# Patient Record
Sex: MALE | Race: WHITE | NOT HISPANIC OR LATINO | Employment: OTHER | URBAN - METROPOLITAN AREA
[De-identification: names, ages, dates, MRNs, and addresses within clinical notes are randomized per-mention and may not be internally consistent; named-entity substitution may affect disease eponyms.]

---

## 2024-07-03 ENCOUNTER — EVALUATION (OUTPATIENT)
Dept: PHYSICAL THERAPY | Facility: CLINIC | Age: 64
End: 2024-07-03
Payer: COMMERCIAL

## 2024-07-03 DIAGNOSIS — M19.012 PRIMARY OSTEOARTHRITIS OF LEFT SHOULDER: ICD-10-CM

## 2024-07-03 DIAGNOSIS — M54.2 CERVICALGIA: Primary | ICD-10-CM

## 2024-07-03 PROCEDURE — 97535 SELF CARE MNGMENT TRAINING: CPT

## 2024-07-03 PROCEDURE — 97162 PT EVAL MOD COMPLEX 30 MIN: CPT

## 2024-07-03 RX ORDER — MONTELUKAST SODIUM 4 MG/1
4 TABLET, CHEWABLE ORAL
COMMUNITY

## 2024-07-03 RX ORDER — ATORVASTATIN CALCIUM 10 MG/1
10 TABLET, FILM COATED ORAL DAILY
COMMUNITY

## 2024-07-03 RX ORDER — ASPIRIN 81 MG/1
81 TABLET, CHEWABLE ORAL DAILY
COMMUNITY

## 2024-07-03 RX ORDER — LOSARTAN POTASSIUM AND HYDROCHLOROTHIAZIDE 12.5; 5 MG/1; MG/1
1 TABLET ORAL DAILY
COMMUNITY

## 2024-07-03 RX ORDER — OMEPRAZOLE 20 MG/1
20 CAPSULE, DELAYED RELEASE ORAL DAILY
COMMUNITY

## 2024-07-03 NOTE — PROGRESS NOTES
PT Evaluation     Today's date: 7/3/2024  Patient name: Duane A Lacock  : 1960  MRN: [id number]  Referring provider: Colleen Cota PA-C  Dx:   Encounter Diagnosis     ICD-10-CM    1. Cervicalgia  M54.2       2. Primary osteoarthritis of left shoulder  M19.012                      Assessment  Impairments: abnormal or restricted ROM, activity intolerance, impaired physical strength, lacks appropriate home exercise program and pain with function    Assessment details: Pt presents with pain/altered sensation left c-spine/LUE.  Hx chronic symptoms however recent significant increase in symptoms noted.  Symptom increased following progression of weight with exercise program.  Reports symptoms with static sitting and following activity. Reports mm spasms/tightness left upper back and scapular region.  Will benefit from PT tx to decrease symptoms and improve functional level.       Prognosis: good    Goals  ST.  Decreased pain 50% 6 wk   2.  Decrease headaches 50% 6 wk  3.  Increase c-spine ROM to Min limitations 6wk  4.  Increased c-spine strength to 5/5 6 wk   5.  Pt will report no difficulty with light ADL's 6 wk    LT.  Pt will report no pain 12 wk  2.  Pt will report no headaches 12 wk  3.  Increase c-spine AROM to WNL 12 wk  4.  Increase c-spine strength to WNL 12 wk  5.  Pt will report no limitations with ADL's 12 wk  6.  Pt will return to normal exercise program with no difficulties 12 wk    Plan  Patient would benefit from: PT eval and skilled physical therapy  Planned modality interventions: cryotherapy, thermotherapy: hydrocollator packs, unattended electrical stimulation, ultrasound and traction    Planned therapy interventions: strengthening, stretching, therapeutic activities, therapeutic exercise, flexibility, functional ROM exercises, home exercise program, neuromuscular re-education, joint mobilization, abdominal trunk stabilization, manual therapy, postural training and self  care    Frequency: 3x week  Duration in weeks: 12  Treatment plan discussed with: patient        Subjective Evaluation    History of Present Illness  Mechanism of injury: Hx cervical and left shoulder pain,  (Onset 11 years ago)  following mitral valve surgery.  Saw neurosurgeon and found herniated disc/osteophytes no surgery.  Had PT which resolved symptoms.  Has had tx in the past which helped.   Reports 3 years ago was having spasms left upper trap/shoulder.  Had PT again and symptoms resolved.  Recently increased weight with free weights and begain having symptoms again.  Reports 24 had numbness left face and UE.  Went to ER and imaging revealed continued issues with c-spine.  TIA/CVA ruled out.  Saw Neurosurgeon and PT and pain managemnet recommended.  To see Neurologist on  or possible EMG.    Patient Goals  Patient goals for therapy: decreased pain  Patient goal: Retrun to normal routine and daily activity  Pain  At worst pain ratin  Location: Left upper trap/LUE to hand  Relieving factors: ice and rest  Progression: improved    Hand dominance: right  Exercise history: Exercises 5 days a week.          Objective     Concurrent Complaints  Positive for headaches. Negative for night pain and disturbed sleep    Additional Special Questions  Daily headaches noted     Postural Observations    Additional Postural Observation Details  Minimal forward rounded shoulders  Left shoulder elevated     Palpation   Left   Muscle spasm in the upper trapezius.   Tenderness of the cervical paraspinals, levator scapulae, middle trapezius, rhomboids and upper trapezius.     Additional Palpation Details  Tenderness and MM Spasms in left scapular mm    Active Range of Motion   Cervical/Thoracic Spine       Cervical  Subcranial protraction:  WFL   Subcranial retraction:  WFL   Flexion:  WFL  Extension:  WFL  Left lateral flexion:  Restriction level: moderate  Right lateral flexion:  Restriction level moderate  Left  rotation:  WFL  Right rotation:  WFL    Joint Play   Joints within functional limits: T1, T2, T3, T4, T5 and T6     Strength/Myotome Testing   Cervical Spine   Neck extension: 4+  Neck flexion: 4+    Left   Neck lateral flexion (C3): 4+    Right   Neck lateral flexion (C3): 4+    Left Shoulder     Planes of Motion   Flexion: 5   Adduction: 5   External rotation at 0°: 5   Internal rotation at 0°: 5     Isolated Muscles   Upper trapezius: 5     Right Shoulder     Planes of Motion   Flexion: 5   Adduction: 5   External rotation at 0°: 5   Internal rotation at 0°: 5     Isolated Muscles   Upper trapezius: 5     Left Elbow   Flexion: 5  Extension: 5    Right Elbow   Flexion: 5  Extension: 5    Additional Strength Details  Minimal symptoms with resisted shoulder ER on left in left scapular region    Tests   Cervical   Negative vertical compression and cervical distraction.     Left   Positive Spurling's Test A.     Right   Negative Spurling's Test A.     Lumbar   Negative vertical compression.   Neuro Exam:     Headaches   Patient reports headaches: Yes.            Precautions:  N/A       Manuals 7-3-24       Manual traction        Stretch c-spine        STM                 Neuro Re-Ed         Chin tuck        Chin tuck w/ cervical ext        Wall micah        Supine neck flexion        Shrug/post roll                        Ther Ex        UBE  alt        Prone shld flex, ext, horz abd        Self upper trap stretch        Doorway ER stretch        Self levator stretch                        HEP Instructed and issued HEP  ([id number])       Ther Activity                        Gait Training                        Modalities        CP/MHP

## 2024-07-03 NOTE — LETTER
July 3, 2024    Colleen Cota PA-C  [address] [place] [address]    Patient: Duane A Lacock   YOB: 1960   Date of Visit: 7/3/2024     Encounter Diagnosis     ICD-10-CM    1. Cervicalgia  M54.2       2. Primary osteoarthritis of left shoulder  M19.012           Dear Dr. Cota:    Thank you for your recent referral of Duane A Lacock. Please review the attached evaluation summary from Duane's recent visit.     Please verify that you agree with the plan of care by signing the attached order.     If you have any questions or concerns, please do not hesitate to call.     I sincerely appreciate the opportunity to share in the care of one of your patients and hope to have another opportunity to work with you in the near future.       Sincerely,    Isak Stapleton, PT      Referring Provider:      I certify that I have read the below Plan of Care and certify the need for these services furnished under this plan of treatment while under my care.                    Colleen Cota PA-C   [address] [place] [address]  Via Fax: [phone]          PT Evaluation     Today's date: 7/3/2024  Patient name: Duane A Lacock  : 1960  MRN: [id number]  Referring provider: Colleen Cota PA-C  Dx:   Encounter Diagnosis     ICD-10-CM    1. Cervicalgia  M54.2       2. Primary osteoarthritis of left shoulder  M19.012                      Assessment  Impairments: abnormal or restricted ROM, activity intolerance, impaired physical strength, lacks appropriate home exercise program and pain with function    Assessment details: Pt presents with pain/altered sensation left c-spine/LUE.  Hx chronic symptoms however recent significant increase in symptoms noted.  Symptom increased following progression of weight with exercise program.  Reports symptoms with static sitting and following activity. Reports mm spasms/tightness left upper back and scapular region.  Will benefit from PT tx to decrease symptoms  and improve functional level.       Prognosis: good    Goals  ST.  Decreased pain 50% 6 wk   2.  Decrease headaches 50% 6 wk  3.  Increase c-spine ROM to Min limitations 6wk  4.  Increased c-spine strength to 5/5 6 wk   5.  Pt will report no difficulty with light ADL's 6 wk    LT.  Pt will report no pain 12 wk  2.  Pt will report no headaches 12 wk  3.  Increase c-spine AROM to WNL 12 wk  4.  Increase c-spine strength to WNL 12 wk  5.  Pt will report no limitations with ADL's 12 wk  6.  Pt will return to normal exercise program with no difficulties 12 wk    Plan  Patient would benefit from: PT eval and skilled physical therapy  Planned modality interventions: cryotherapy, thermotherapy: hydrocollator packs, unattended electrical stimulation, ultrasound and traction    Planned therapy interventions: strengthening, stretching, therapeutic activities, therapeutic exercise, flexibility, functional ROM exercises, home exercise program, neuromuscular re-education, joint mobilization, abdominal trunk stabilization, manual therapy, postural training and self care    Frequency: 3x week  Duration in weeks: 12  Treatment plan discussed with: patient        Subjective Evaluation    History of Present Illness  Mechanism of injury: Hx cervical and left shoulder pain,  (Onset 11 years ago)  following mitral valve surgery.  Saw neurosurgeon and found herniated disc/osteophytes no surgery.  Had PT which resolved symptoms.  Has had tx in the past which helped.   Reports 3 years ago was having spasms left upper trap/shoulder.  Had PT again and symptoms resolved.  Recently increased weight with free weights and begain having symptoms again.  Reports 24 had numbness left face and UE.  Went to ER and imaging revealed continued issues with c-spine.  TIA/CVA ruled out.  Saw Neurosurgeon and PT and pain managemnet recommended.  To see Neurologist on  or possible EMG.    Patient Goals  Patient goals for therapy: decreased  pain  Patient goal: Retrun to normal routine and daily activity  Pain  At worst pain ratin  Location: Left upper trap/LUE to hand  Relieving factors: ice and rest  Progression: improved    Hand dominance: right  Exercise history: Exercises 5 days a week.          Objective     Concurrent Complaints  Positive for headaches. Negative for night pain and disturbed sleep    Additional Special Questions  Daily headaches noted     Postural Observations    Additional Postural Observation Details  Minimal forward rounded shoulders  Left shoulder elevated     Palpation   Left   Muscle spasm in the upper trapezius.   Tenderness of the cervical paraspinals, levator scapulae, middle trapezius, rhomboids and upper trapezius.     Additional Palpation Details  Tenderness and MM Spasms in left scapular mm    Active Range of Motion   Cervical/Thoracic Spine       Cervical  Subcranial protraction:  WFL   Subcranial retraction:  WFL   Flexion:  WFL  Extension:  WFL  Left lateral flexion:  Restriction level: moderate  Right lateral flexion:  Restriction level moderate  Left rotation:  WFL  Right rotation:  WFL    Joint Play   Joints within functional limits: T1, T2, T3, T4, T5 and T6     Strength/Myotome Testing   Cervical Spine   Neck extension: 4+  Neck flexion: 4+    Left   Neck lateral flexion (C3): 4+    Right   Neck lateral flexion (C3): 4+    Left Shoulder     Planes of Motion   Flexion: 5   Adduction: 5   External rotation at 0°: 5   Internal rotation at 0°: 5     Isolated Muscles   Upper trapezius: 5     Right Shoulder     Planes of Motion   Flexion: 5   Adduction: 5   External rotation at 0°: 5   Internal rotation at 0°: 5     Isolated Muscles   Upper trapezius: 5     Left Elbow   Flexion: 5  Extension: 5    Right Elbow   Flexion: 5  Extension: 5    Additional Strength Details  Minimal symptoms with resisted shoulder ER on left in left scapular region    Tests   Cervical   Negative vertical compression and cervical  distraction.     Left   Positive Spurling's Test A.     Right   Negative Spurling's Test A.     Lumbar   Negative vertical compression.   Neuro Exam:     Headaches   Patient reports headaches: Yes.            Precautions:  N/A       Manuals 7-3-24       Manual traction        Stretch c-spine        STM                 Neuro Re-Ed         Chin tuck        Chin tuck w/ cervical ext        Wall micah        Supine neck flexion        Shrug/post roll                        Ther Ex        UBE  alt        Prone shld flex, ext, horz abd        Self upper trap stretch        Doorway ER stretch        Self levator stretch                        HEP Instructed and issued HEP  ([id number])       Ther Activity                        Gait Training                        Modalities        CP/MHP

## 2024-07-08 ENCOUNTER — OFFICE VISIT (OUTPATIENT)
Dept: PHYSICAL THERAPY | Facility: CLINIC | Age: 64
End: 2024-07-08
Payer: COMMERCIAL

## 2024-07-08 DIAGNOSIS — M19.012 PRIMARY OSTEOARTHRITIS OF LEFT SHOULDER: ICD-10-CM

## 2024-07-08 DIAGNOSIS — M54.2 CERVICALGIA: Primary | ICD-10-CM

## 2024-07-08 PROCEDURE — 97110 THERAPEUTIC EXERCISES: CPT

## 2024-07-08 PROCEDURE — 97112 NEUROMUSCULAR REEDUCATION: CPT

## 2024-07-08 PROCEDURE — 97140 MANUAL THERAPY 1/> REGIONS: CPT

## 2024-07-08 NOTE — PROGRESS NOTES
"Daily Note     Today's date: 2024  Patient name: Duane A Lacock  : 1960  MRN: [id number]  Referring provider: Colleen Cota PA-C  Dx:   Encounter Diagnosis     ICD-10-CM    1. Cervicalgia  M54.2       2. Primary osteoarthritis of left shoulder  M19.012                      Subjective:   The home stretches went ok.  A little sore with them just because I'm not used to doing them      Objective: See treatment diary below      Assessment: Tolerated treatment well.  Continued tenderness noted left scapular region with STM.  No complaints voiced with TE.    Patient would benefit from continued PT      Plan: Continue per plan of care.       Precautions:  N/A       Manuals 7-3-24 24      Manual traction  3'      Stretch c-spine        STM   12'  c-spine/left shoulder-scap              Neuro Re-Ed         Chin tuck  Red t-band resistance 2x10      Chin tuck w/ cervical ext        Wall micah  2x10      Supine neck flexion  2x10      Shrug/post roll                        Ther Ex        UBE  alt  8'  alt      Prone shld flex, ext, horz abd  2x10 ea      Self upper trap stretch  5x 15-20\"  cesar      Doorway ER stretch        Self levator stretch                        HEP Instructed and issued HEP  ([id number])       Ther Activity                        Gait Training                        Modalities        CP/MHP                    "

## 2024-07-11 ENCOUNTER — OFFICE VISIT (OUTPATIENT)
Dept: PHYSICAL THERAPY | Facility: CLINIC | Age: 64
End: 2024-07-11
Payer: COMMERCIAL

## 2024-07-11 DIAGNOSIS — M19.012 PRIMARY OSTEOARTHRITIS OF LEFT SHOULDER: ICD-10-CM

## 2024-07-11 DIAGNOSIS — M54.2 CERVICALGIA: Primary | ICD-10-CM

## 2024-07-11 PROCEDURE — 97140 MANUAL THERAPY 1/> REGIONS: CPT

## 2024-07-11 PROCEDURE — 97110 THERAPEUTIC EXERCISES: CPT

## 2024-07-11 PROCEDURE — 97112 NEUROMUSCULAR REEDUCATION: CPT

## 2024-07-11 NOTE — PROGRESS NOTES
"Daily Note     Today's date: 2024  Patient name: Duane A Lacock  : 1960  MRN: [id number]  Referring provider: Colleen Cota PA-C  Dx:   Encounter Diagnosis     ICD-10-CM    1. Cervicalgia  M54.2       2. Primary osteoarthritis of left shoulder  M19.012                      Subjective: Duane reports soreness to cesar UT. He reports radicular symptoms cesar but more intensity to L. He reports at times radicular symptoms going down to hands but today is to mid forearm.       Objective: See treatment diary below      Assessment: Added LTP and \"Ts\" this visit to continue to progressing scapular strengthening and ensure no UT compensation. Increased tone to L UT and increased elevation in L UE compared to R in upright position noted during tband exercises; tactile cues to correct. Pt denied any radicular symptoms by end of visit. Pt would continue to benefit from skilled PT.       Plan: Continue with current POC to address pt deficits.      Precautions:  N/A       Manuals 7-3-24 7-8-24 7-11-24     Manual traction  3' 3'      Stretch c-spine        STM   12'  c-spine/left shoulder-scap 12' c-spine/L shoulder and scap             Neuro Re-Ed         Chin tuck  Red t-band resistance 2x10 Red t-band resistance 2x10     Chin tuck w/ cervical ext        Wall micah  2x10 2x10     Supine neck flexion  2x10 2x10     Shrug/post roll        LTP   Dbl blue 2x10     T's at webslide    Dbl blue 2x10     Ther Ex        UBE  alt  8'  alt 8' alt      Prone shld flex, ext, horz abd  2x10 ea 2x10 ea     Self upper trap stretch  5x 15-20\"  cesar 5x 15-20\"  cesar     Doorway ER stretch        Self levator stretch                        HEP Instructed and issued HEP  ([id number])       Ther Activity                        Gait Training                        Modalities        CP/MHP                     "

## 2024-07-15 ENCOUNTER — OFFICE VISIT (OUTPATIENT)
Dept: PHYSICAL THERAPY | Facility: CLINIC | Age: 64
End: 2024-07-15
Payer: COMMERCIAL

## 2024-07-15 DIAGNOSIS — M19.012 PRIMARY OSTEOARTHRITIS OF LEFT SHOULDER: ICD-10-CM

## 2024-07-15 DIAGNOSIS — M54.2 CERVICALGIA: Primary | ICD-10-CM

## 2024-07-15 PROCEDURE — 97140 MANUAL THERAPY 1/> REGIONS: CPT

## 2024-07-15 PROCEDURE — 97110 THERAPEUTIC EXERCISES: CPT

## 2024-07-15 PROCEDURE — 97112 NEUROMUSCULAR REEDUCATION: CPT

## 2024-07-15 NOTE — PROGRESS NOTES
"Daily Note     Today's date: 7/15/2024  Patient name: Duane A Lacock  : 1960  MRN: [id number]  Referring provider: Colleen Cota PA-C  Dx:   Encounter Diagnosis     ICD-10-CM    1. Cervicalgia  M54.2       2. Primary osteoarthritis of left shoulder  M19.012                      Subjective:   I'm doing better.  I feel its improving       Objective: See treatment diary below      Assessment: Tolerated treatment well.  Able to increase reps with TE.  Reports improving symptoms.  Continued mm tightness noted left upper trap/scapular region.   Patient would benefit from continued PT      Plan: Continue per plan of care.      Precautions:  N/A       Manuals 7-3-24 7-8-24 7-11-24 7-15-24    Manual traction  3' 3'  3'    Stretch c-spine        STM   12'  c-spine/left shoulder-scap 12' c-spine/L shoulder and scap 12'  c-spine, left shoulder/scap            Neuro Re-Ed         Chin tuck  Red t-band resistance 2x10 Red t-band resistance 2x10 Red 3x10    Chin tuck w/ cervical ext        Wall micah  2x10 2x10 3x10    Supine neck flexion  2x10 2x10 3x10    Shrug/post roll        LTP   Dbl blue 2x10 Blue 3x10 LTP/MTP    T's at webslide    Dbl blue 2x10 Blue 3x10    Ther Ex        UBE  alt  8'  alt 8' alt  10'  alt    Prone shld flex, ext, horz abd  2x10 ea 2x10 ea 3x10 ea     Self upper trap stretch  5x 15-20\"  cesar 5x 15-20\"  cesar 5x  15\"  cesar    Doorway ER stretch        Self levator stretch                        HEP Instructed and issued HEP  ([id number])       Ther Activity                        Gait Training                        Modalities        CP/MHP                       "

## 2024-07-18 ENCOUNTER — OFFICE VISIT (OUTPATIENT)
Dept: PHYSICAL THERAPY | Facility: CLINIC | Age: 64
End: 2024-07-18
Payer: COMMERCIAL

## 2024-07-18 DIAGNOSIS — M19.012 PRIMARY OSTEOARTHRITIS OF LEFT SHOULDER: ICD-10-CM

## 2024-07-18 DIAGNOSIS — M54.2 CERVICALGIA: Primary | ICD-10-CM

## 2024-07-18 PROCEDURE — 97110 THERAPEUTIC EXERCISES: CPT

## 2024-07-18 PROCEDURE — 97140 MANUAL THERAPY 1/> REGIONS: CPT

## 2024-07-18 PROCEDURE — 97112 NEUROMUSCULAR REEDUCATION: CPT

## 2024-07-18 NOTE — PROGRESS NOTES
"Daily Note     Today's date: 2024  Patient name: Duane A Lacock  : 1960  MRN: [id number]  Referring provider: Colleen Cota PA-C  Dx:   Encounter Diagnosis     ICD-10-CM    1. Cervicalgia  M54.2       2. Primary osteoarthritis of left shoulder  M19.012                      Subjective:   I'm feeling better.  I still feel it when I'm sitting       Objective: See treatment diary below      Assessment: Tolerated treatment well.   Held supine BUE flexion as he feels this was irritating c-spine.  Added T-band \"Y\" which he did well with.  No increase in symptoms noted with tx.  Feels he is improving.  Patient would benefit from continued PT      Plan: Continue per plan of care.      Precautions:  N/A       Manuals 7-3-24 7-8-24 7-11-24 7-15-24 7-18-24   Manual traction  3' 3'  3' 5'   Stretch c-spine        STM   12'  c-spine/left shoulder-scap 12' c-spine/L shoulder and scap 12'  c-spine, left shoulder/scap 10'  c-spine, left shoulder/scap           Neuro Re-Ed         Chin tuck  Red t-band resistance 2x10 Red t-band resistance 2x10 Red 3x10 Red 3x10     Chin tuck w/ cervical ext        Wall micah  2x10 2x10 3x10 3x10   Supine neck flexion  2x10 2x10 3x10 3x10    Shrug/post roll        LTP/MTP   Dbl blue 2x10 Blue 3x10 LTP/MTP Blue 3x10    T's at webslide    Dbl blue 2x10 Blue 3x10 T and Y   Blue 3x10   Ther Ex        UBE  alt  8'  alt 8' alt  10'  alt 10'  alt   Prone shld flex, ext, horz abd  2x10 ea 2x10 ea 3x10 ea  Abd/Ext 3x10 ea   Self upper trap stretch  5x 15-20\"  cesar 5x 15-20\"  cesar 5x  15\"  cesar 5x  15\"  cesar   Doorway ER stretch        Self levator stretch                        HEP Instructed and issued HEP  ([id number])       Ther Activity                        Gait Training                        Modalities        CP/MHP                         "

## 2024-07-23 ENCOUNTER — OFFICE VISIT (OUTPATIENT)
Dept: PHYSICAL THERAPY | Facility: CLINIC | Age: 64
End: 2024-07-23
Payer: COMMERCIAL

## 2024-07-23 DIAGNOSIS — M19.012 PRIMARY OSTEOARTHRITIS OF LEFT SHOULDER: ICD-10-CM

## 2024-07-23 DIAGNOSIS — M54.2 CERVICALGIA: Primary | ICD-10-CM

## 2024-07-23 PROCEDURE — 97140 MANUAL THERAPY 1/> REGIONS: CPT

## 2024-07-23 PROCEDURE — 97112 NEUROMUSCULAR REEDUCATION: CPT

## 2024-07-23 PROCEDURE — 97110 THERAPEUTIC EXERCISES: CPT

## 2024-07-23 NOTE — PROGRESS NOTES
"Daily Note     Today's date: 2024  Patient name: Duane A Lacock  : 1960  MRN: [id number]  Referring provider: Colleen Cota PA-C  Dx:   Encounter Diagnosis     ICD-10-CM    1. Cervicalgia  M54.2       2. Primary osteoarthritis of left shoulder  M19.012                      Subjective:   Reports soreness from pruning trees over the weekend.      Objective: See treatment diary below      Assessment: Tolerated treatment well.  Has follow up with Neurology this week for c-spine.  Reports overall improved with PT however symptoms do persist intermittently.  Patient would benefit from continued PT      Plan: Continue per plan of care.      Precautions:  N/A       Manuals 7-23-24 7-8-24 7-11-24 7-15-24 7-18-24   Manual traction 3' 3' 3'  3' 5'   Stretch c-spine        STM  10' 12'  c-spine/left shoulder-scap 12' c-spine/L shoulder and scap 12'  c-spine, left shoulder/scap 10'  c-spine, left shoulder/scap           Neuro Re-Ed         Chin tuck Red t-band 3x10 Red t-band resistance 2x10 Red t-band resistance 2x10 Red 3x10 Red 3x10     Chin tuck w/ cervical ext        Wall micah 3x10 2x10 2x10 3x10 3x10   Supine neck flexion 3x10   2x10 2x10 3x10 3x10    Shrug/post roll        LTP/MTP Blue 3x10   Dbl blue 2x10 Blue 3x10 LTP/MTP Blue 3x10    T's at webslide  Blue 3x10  ea T and Y  Dbl blue 2x10 Blue 3x10 T and Y   Blue 3x10   Ther Ex        UBE  alt 10'  alt 8'  alt 8' alt  10'  alt 10'  alt   Prone shld flex, ext, horz abd 3x10 ea  abd/ext 2x10 ea 2x10 ea 3x10 ea  Abd/Ext 3x10 ea   Self upper trap stretch 5x  15-20\" 5x 15-20\"  cesar 5x 15-20\"  cesar 5x  15\"  cesar 5x  15\"  cesar   Doorway ER stretch 5x  15-20\"        Self levator stretch                        HEP        Ther Activity                        Gait Training                        Modalities        CP/MHP                           "

## 2024-07-25 ENCOUNTER — OFFICE VISIT (OUTPATIENT)
Dept: PHYSICAL THERAPY | Facility: CLINIC | Age: 64
End: 2024-07-25
Payer: COMMERCIAL

## 2024-07-25 DIAGNOSIS — M19.012 PRIMARY OSTEOARTHRITIS OF LEFT SHOULDER: ICD-10-CM

## 2024-07-25 DIAGNOSIS — M54.2 CERVICALGIA: Primary | ICD-10-CM

## 2024-07-25 PROCEDURE — 97140 MANUAL THERAPY 1/> REGIONS: CPT

## 2024-07-25 PROCEDURE — 97112 NEUROMUSCULAR REEDUCATION: CPT

## 2024-07-25 PROCEDURE — 97110 THERAPEUTIC EXERCISES: CPT

## 2024-07-25 NOTE — PROGRESS NOTES
"Daily Note     Today's date: 2024  Patient name: Duane A Lacock  : 1960  MRN: [id number]  Referring provider: Colleen Cota PA-C  Dx:   Encounter Diagnosis     ICD-10-CM    1. Cervicalgia  M54.2       2. Primary osteoarthritis of left shoulder  M19.012                      Subjective: Duane reports having recent f/u with neurologist and is to continue with PT as he feels it is helping before seeing a neurosurgeon but he neurologist feels his radicular symptoms are coming from his c-spine.       Objective: See treatment diary below      Assessment: Improvement in soft tissue quality following STM to cesar UT,cerv paraspinals, scap retractors. Increased tone to L UT vs R. Denied any radicular symptoms with exercises performed. Added cesar ER with scap retract. Pt would continue to benefit from skilled PT .       Plan: Continue with current POC to address pt deficits.      Precautions:  N/A       Manuals 7-23-24 7-25-24 7-11-24 7-15-24 7-18-24   Manual traction 3' 3'  3'  3' 5'   Stretch c-spine        STM  10' 10' 12' c-spine/L shoulder and scap 12'  c-spine, left shoulder/scap 10'  c-spine, left shoulder/scap           Neuro Re-Ed         Chin tuck Red t-band 3x10 Grn tband 2x10 3-5\" Red t-band resistance 2x10 Red 3x10 Red 3x10     Chin tuck w/ cervical ext        Wall micah 3x10 3x10 2x10 3x10 3x10   Supine neck flexion 3x10   3x10 2x10 3x10 3x10    Shrug/post roll        LTP/MTP Blue 3x10  Blue 3x10 ea (consider increasing NV Dbl blue 2x10 Blue 3x10 LTP/MTP Blue 3x10    Cesar ER with scap retraction   Blue 3x10      T's at webslide  Blue 3x10  ea T and Y Blue 3x10 ea T and Y  Dbl blue 2x10 Blue 3x10 T and Y   Blue 3x10   Ther Ex        UBE  alt 10'  alt 10' alt  8' alt  10'  alt 10'  alt   Prone shld flex, ext, horz abd 3x10 ea  abd/ext 3x10 ea abd/ext  2x10 ea 3x10 ea  Abd/Ext 3x10 ea   Self upper trap stretch 5x  15-20\" 5x 15\" cesar +levator  5x 15-20\"  cesar 5x  15\"  cesar 5x  15\"  cesar   Doorway ER stretch 5x  " "15-20\"        Self levator stretch                        HEP        Ther Activity                        Gait Training                        Modalities        CP/MHP                             "

## 2024-07-30 ENCOUNTER — OFFICE VISIT (OUTPATIENT)
Dept: PHYSICAL THERAPY | Facility: CLINIC | Age: 64
End: 2024-07-30
Payer: COMMERCIAL

## 2024-07-30 DIAGNOSIS — M54.2 CERVICALGIA: Primary | ICD-10-CM

## 2024-07-30 DIAGNOSIS — M19.012 PRIMARY OSTEOARTHRITIS OF LEFT SHOULDER: ICD-10-CM

## 2024-07-30 PROCEDURE — 97140 MANUAL THERAPY 1/> REGIONS: CPT

## 2024-07-30 PROCEDURE — 97112 NEUROMUSCULAR REEDUCATION: CPT

## 2024-07-30 PROCEDURE — 97110 THERAPEUTIC EXERCISES: CPT

## 2024-07-30 NOTE — PROGRESS NOTES
"Daily Note     Today's date: 2024  Patient name: Duane A Lacock  : 1960  MRN: [id number]  Referring provider: Colleen Cota PA-C  Dx:   Encounter Diagnosis     ICD-10-CM    1. Cervicalgia  M54.2       2. Primary osteoarthritis of left shoulder  M19.012                      Subjective:   I saw the Neurologist and she said there are degenerative changes in the neck.  They are having me see a Neurosurgeon and I might see a pain management doctor also.        Objective: See treatment diary below      Assessment: Tolerated treatment well.   Held use of T-band with retractions as pt feels having Ue's in elevated position aggravates c-spine.  Reports retractions did feel good without use of t-band.  Has begun use of home traction unit again and no increase in symptoms noted with this.  Continued varied symptoms noted.  Patient would benefit from continued PT      Plan: Continue per plan of care.      Precautions:  N/A       Manuals 7-23-24 7-25-24 7-30-24 7-15-24 7-18-24   Manual traction 3' 3'  3'  3' 5'   Stretch c-spine        STM  10' 10' 12' c-spine/L shoulder and scap 12'  c-spine, left shoulder/scap 10'  c-spine, left shoulder/scap           Neuro Re-Ed         Chin tuck Red t-band 3x10 Grn tband 2x10 3-5\" 3x10  3-5\" Red 3x10 Red 3x10     Chin tuck w/ cervical ext        Wall micah 3x10 3x10 3x10 3x10 3x10   Supine neck flexion 3x10   3x10 3x10 3x10 3x10    Shrug/post roll        LTP/MTP Blue 3x10  Blue 3x10 ea (consider increasing NV Dbl blue 3x10 Blue 3x10 LTP/MTP Blue 3x10    Cesar ER with scap retraction   Blue 3x10 Blue 3x10     T's at webslide  Blue 3x10  ea T and Y Blue 3x10 ea T and Y  Dbl blue 3x10  \"T\" Blue 3x10 T and Y   Blue 3x10   Ther Ex        UBE  alt 10'  alt 10' alt  10' alt  L1.5  10'  alt 10'  alt   Prone shld flex, ext, horz abd 3x10 ea  abd/ext 3x10 ea abd/ext   3x10 ea  Abd/Ext 3x10 ea   Self upper trap stretch 5x  15-20\" 5x 15\" cesar +levator  5x 15-20\"  cesar 5x  15\"  cesar 5x  15\"  " "cesar   Doorway ER stretch 5x  15-20\"   5x  15-20\"       Self levator stretch   5x  15-20\"  cesar                     HEP        Ther Activity                        Gait Training                        Modalities        CP/MHP                               "

## 2024-08-02 ENCOUNTER — OFFICE VISIT (OUTPATIENT)
Dept: PHYSICAL THERAPY | Facility: CLINIC | Age: 64
End: 2024-08-02
Payer: COMMERCIAL

## 2024-08-02 DIAGNOSIS — M19.012 PRIMARY OSTEOARTHRITIS OF LEFT SHOULDER: ICD-10-CM

## 2024-08-02 DIAGNOSIS — M54.2 CERVICALGIA: Primary | ICD-10-CM

## 2024-08-02 PROCEDURE — 97112 NEUROMUSCULAR REEDUCATION: CPT

## 2024-08-02 PROCEDURE — 97110 THERAPEUTIC EXERCISES: CPT

## 2024-08-02 PROCEDURE — 97140 MANUAL THERAPY 1/> REGIONS: CPT

## 2024-08-02 NOTE — PROGRESS NOTES
"Daily Note     Today's date: 2024  Patient name: Duane A Lacock  : 1960  MRN: [id number]  Referring provider: Colleen Cota PA-C  Dx:   Encounter Diagnosis     ICD-10-CM    1. Cervicalgia  M54.2       2. Primary osteoarthritis of left shoulder  M19.012                      Subjective:   I'm doing ok today      Objective: See treatment diary below      Assessment: Tolerated treatment well.   Reports feeling better with modified tx.  Has used traction at home without increased symptoms.  Patient would benefit from continued PT      Plan: Continue per plan of care.      Precautions:  N/A       Manuals 24   Manual traction 3' 3'  3'  3' 5'   Stretch c-spine        STM  10' 10' 12' c-spine/L shoulder and scap 12'  c-spine, left shoulder/scap 10'  c-spine, left shoulder/scap           Neuro Re-Ed         Chin tuck Red t-band 3x10 Grn tband 2x10 3-5\" 3x10  3-5\" 3x10  3-5\" Red 3x10     Chin tuck w/ cervical ext        Wall micah 3x10 3x10 3x10 3x10 3x10   Supine neck flexion 3x10   3x10 3x10 3x10 3x10    Shrug/post roll        LTP/MTP Blue 3x10  Blue 3x10 ea (consider increasing NV Dbl blue 3x10 Blue 3x15 LTP/MTP Blue 3x10    Cesar ER with scap retraction   Blue 3x10 Blue 3x10     T's at [facility]  Blue 3x10  ea T and Y Blue 3x10 ea T and Y  Dbl blue 3x10  \"T\" Blue 3x15 T and Y   Blue 3x10   Ther Ex        UBE  alt 10'  alt 10' alt  10' alt  L1.5  10'  alt   L 1.5 10'  alt   Prone shld flex, ext, horz abd 3x10 ea  abd/ext 3x10 ea abd/ext    Abd/Ext 3x10 ea   Self upper trap stretch 5x  15-20\" 5x 15\" cesar +levator  5x 15-20\"  cesar 5x  15\"  cesar 5x  15\"  cesar   Doorway ER stretch 5x  15-20\"   5x  15-20\"   5x  15\"  cesar    Self levator stretch   5x  15-20\"  cesar 5x  15\"  cesar                    HEP        Ther Activity                        Gait Training                        Modalities        CP/MHP                                 "

## 2024-08-06 ENCOUNTER — OFFICE VISIT (OUTPATIENT)
Dept: PHYSICAL THERAPY | Facility: CLINIC | Age: 64
End: 2024-08-06
Payer: COMMERCIAL

## 2024-08-06 DIAGNOSIS — M19.012 PRIMARY OSTEOARTHRITIS OF LEFT SHOULDER: ICD-10-CM

## 2024-08-06 DIAGNOSIS — M54.2 CERVICALGIA: Primary | ICD-10-CM

## 2024-08-06 PROCEDURE — 97110 THERAPEUTIC EXERCISES: CPT

## 2024-08-06 PROCEDURE — 97140 MANUAL THERAPY 1/> REGIONS: CPT

## 2024-08-06 PROCEDURE — 97112 NEUROMUSCULAR REEDUCATION: CPT

## 2024-08-06 NOTE — PROGRESS NOTES
"Daily Note     Today's date: 2024  Patient name: Duane A Lacock  : 1960  MRN: [id number]  Referring provider: Colleen Cota PA-C  Dx:   Encounter Diagnosis     ICD-10-CM    1. Cervicalgia  M54.2       2. Primary osteoarthritis of left shoulder  M19.012                      Subjective:   I've been feeling alright       Objective: See treatment diary below      Assessment: Tolerated treatment well.  No pain noted with tx.  Reports overall improved however intermittent symptoms persist.  Patient would benefit from continued PT      Plan: Continue per plan of care.      Precautions:  N/A       Manuals 24   Manual traction 3' 3'  3'  3' 3'   Stretch c-spine        STM  10' 10' 12' c-spine/L shoulder and scap 12'  c-spine, left shoulder/scap 10'  c-spine, left shoulder/scap           Neuro Re-Ed         Chin tuck Red t-band 3x10 Grn tband 2x10 3-5\" 3x10  3-5\" 3x10  3-5\"  3x10     Chin tuck w/ cervical ext        Wall micah 3x10 3x10 3x10 3x10 3x10   Supine neck flexion 3x10   3x10 3x10 3x10 3x10    Shrug/post roll        LTP/MTP Blue 3x10  Blue 3x10 ea (consider increasing NV Dbl blue 3x10 Blue 3x15 LTP/MTP Black 3x10    Cesar ER with scap retraction   Blue 3x10 Blue 3x10     T's at webslide  Blue 3x10  ea T and Y Blue 3x10 ea T and Y  Dbl blue 3x10  \"T\" Blue 3x15 \"T\" black 3x10   Ther Ex        UBE  alt 10'  alt 10' alt  10' alt  L1.5  10'  alt   L 1.5 10'  alt  L2   Prone shld flex, ext, horz abd 3x10 ea  abd/ext 3x10 ea abd/ext       Self upper trap stretch 5x  15-20\" 5x 15\" cesar +levator  5x 15-20\"  cesar 5x  15\"  cesar 5x  15\"  cesar   Doorway ER stretch 5x  15-20\"   5x  15-20\"   5x  15\"  cesar 10x  15\"   Self levator stretch   5x  15-20\"  cesar 5x  15\"  cesar 5x  15\"  cesar                   HEP        Ther Activity                        Gait Training                        Modalities        CP/MHP                                   "

## 2024-08-08 ENCOUNTER — OFFICE VISIT (OUTPATIENT)
Dept: PHYSICAL THERAPY | Facility: CLINIC | Age: 64
End: 2024-08-08
Payer: COMMERCIAL

## 2024-08-08 DIAGNOSIS — M19.012 PRIMARY OSTEOARTHRITIS OF LEFT SHOULDER: ICD-10-CM

## 2024-08-08 DIAGNOSIS — M54.2 CERVICALGIA: Primary | ICD-10-CM

## 2024-08-08 PROCEDURE — 97110 THERAPEUTIC EXERCISES: CPT

## 2024-08-08 PROCEDURE — 97112 NEUROMUSCULAR REEDUCATION: CPT

## 2024-08-08 PROCEDURE — 97140 MANUAL THERAPY 1/> REGIONS: CPT

## 2024-08-08 NOTE — PROGRESS NOTES
"Daily Note     Today's date: 2024  Patient name: Duane A Lacock  : 1960  MRN: [id number]  Referring provider: Colleen Cota PA-C  Dx:   Encounter Diagnosis     ICD-10-CM    1. Cervicalgia  M54.2       2. Primary osteoarthritis of left shoulder  M19.012           Start Time: 0943          Subjective: Duane reports less intensity of radicular symptoms since starting skilled PT and also feels less pain/discomfort in L shoulder.       Objective: See treatment diary below      Assessment: Visual and VC to ensure correct exercise technique. Improvement in soft tissue quality/tone to cesar UT, cerv paraspinals following manuals, increased tightness in R UT vs L. Denied any increases in pain with exercises performed. Progressed LTP/MTP to machine resistance with good tolerance. Progress as able.       Plan: Continue with current POC to address pt deficits.      Precautions:  N/A       Manuals 24   Manual traction 3'  3'  3'  3' 3'   Stretch c-spine        STM  10' c-spine, L shoulder/scap  10' 12' c-spine/L shoulder and scap 12'  c-spine, left shoulder/scap 10'  c-spine, left shoulder/scap           Neuro Re-Ed         Chin tuck 3x10 Grn tband 2x10 3-5\" 3x10  3-5\" 3x10  3-5\"  3x10     Chin tuck w/ cervical ext        Wall micah 3x10 3x10 3x10 3x10 3x10   Supine neck flexion 3x10 3x10 3x10 3x10 3x10    Shrug/post roll        LTP/MTP Progressed to machine weights HEP Blue 3x10 ea (consider increasing NV Dbl blue 3x10 Blue 3x15 LTP/MTP Black 3x10    Lat pulldown  40# 3x10       Seated row  40# 3x10       Cesar ER with scap retraction  Blue 3x10 Blue 3x10 Blue 3x10     T's at webslide  \"T\" black 3x10 Blue 3x10 ea T and Y  Dbl blue 3x10  \"T\" Blue 3x15 \"T\" black 3x10   Ther Ex        UBE  alt 10' alt L2  10' alt  10' alt  L1.5  10'  alt   L 1.5 10'  alt  L2   Prone shld flex, ext, horz abd  3x10 ea abd/ext       Self upper trap stretch 5x 15\" cesar  5x 15\" cesar +levator  5x 15-20\"  cesar 5x  " "15\"  cesar 5x  15\"  cesar   Doorway ER stretch 10x 15\"  5x  15-20\"   5x  15\"  cesar 10x  15\"   Self levator stretch 5x 15\" cesar   5x  15-20\"  csear 5x  15\"  cesar 5x  15\"  cesar                   HEP        Ther Activity                        Gait Training                        Modalities        CP/MHP                                     "

## 2024-08-14 ENCOUNTER — OFFICE VISIT (OUTPATIENT)
Dept: PHYSICAL THERAPY | Facility: CLINIC | Age: 64
End: 2024-08-14
Payer: COMMERCIAL

## 2024-08-14 DIAGNOSIS — M54.2 CERVICALGIA: Primary | ICD-10-CM

## 2024-08-14 DIAGNOSIS — M19.012 PRIMARY OSTEOARTHRITIS OF LEFT SHOULDER: ICD-10-CM

## 2024-08-14 PROCEDURE — 97140 MANUAL THERAPY 1/> REGIONS: CPT

## 2024-08-14 PROCEDURE — 97112 NEUROMUSCULAR REEDUCATION: CPT

## 2024-08-14 PROCEDURE — 97110 THERAPEUTIC EXERCISES: CPT

## 2024-08-14 NOTE — PROGRESS NOTES
"Daily Note     Today's date: 2024  Patient name: Duane A Lacock  : 1960  MRN: [id number]  Referring provider: Colleen Cota PA-C  Dx:   Encounter Diagnosis     ICD-10-CM    1. Cervicalgia  M54.2       2. Primary osteoarthritis of left shoulder  M19.012                      Subjective: Duane reports doing well in reference to c-spine and L shoulder. He would like to avoid any chest press as he is to avoid bench press due to L shoulder pain. He reports great decrease in intensity of radicular symptoms and has moments w/o radicular symptoms. He doesn't think about the pain as much and can sleep without difficulty.       Objective: See treatment diary below      Assessment: Visual and VC to ensure correct exercise technique. Improvement in soft tissue quality to cesar UT, cerv paraspinals, and suboccipitals following manuals. Pt educated depending on the severity of disc herniation/ pathology they can reabsorb but may benefit from further education regarding aging and disc changes/healing and myelomalacia. Pt denied any increases in pain with exercises performed. Pt would continue to benefit from skilled PT.       Plan: Continue with current POC to address pt deficits.      Precautions:  N/A       Manuals 2424   Manual traction 3'  3'  3'  3' 3'   Stretch c-spine        STM  10' c-spine, L shoulder/scap  10' c-spine, L shoulder/scap 12' c-spine/L shoulder and scap 12'  c-spine, left shoulder/scap 10'  c-spine, left shoulder/scap           Neuro Re-Ed         Chin tuck 3x10 3x10 3x10  3-5\" 3x10  3-5\"  3x10     Chin tuck w/ cervical ext        Wall micah 3x10 3x10 3x10 3x10 3x10   Supine neck flexion 3x10 3x10 3x10 3x10 3x10    Shrug/post roll        LTP/MTP Progressed to machine weights HEP  Dbl blue 3x10 Blue 3x15 LTP/MTP Black 3x10    Lat pulldown  40# 3x10 45# 3x10      Seated row  40# 3x10 40# 3x10      Cesar ER with scap retraction  Blue 3x10 Blue 3x10 Blue 3x10     T's at " "webslide  \"T\" black 3x10 \"T\" black 3x10 Dbl blue 3x10  \"T\" Blue 3x15 \"T\" black 3x10   Ther Ex        UBE  alt 10' alt L2  10' alt L2  10' alt  L1.5  10'  alt   L 1.5 10'  alt  L2   Prone shld flex, ext, horz abd        Self upper trap stretch 5x 15\" cesar  5x 15\" cesar  5x 15-20\"  cesar 5x  15\"  cesar 5x  15\"  cesar   Doorway ER stretch 10x 15\" 10x15\" 5x  15-20\"   5x  15\"  cesar 10x  15\"   Self levator stretch 5x 15\" cesar  5x15\" cesar  5x  15-20\"  cesar 5x  15\"  cesar 5x  15\"  cesar                   HEP        Ther Activity                        Gait Training                        Modalities        CP/MHP                                       "

## 2024-08-16 ENCOUNTER — OFFICE VISIT (OUTPATIENT)
Dept: PHYSICAL THERAPY | Facility: CLINIC | Age: 64
End: 2024-08-16
Payer: COMMERCIAL

## 2024-08-16 DIAGNOSIS — M19.012 PRIMARY OSTEOARTHRITIS OF LEFT SHOULDER: ICD-10-CM

## 2024-08-16 DIAGNOSIS — M54.2 CERVICALGIA: Primary | ICD-10-CM

## 2024-08-16 PROCEDURE — 97140 MANUAL THERAPY 1/> REGIONS: CPT

## 2024-08-16 PROCEDURE — 97110 THERAPEUTIC EXERCISES: CPT

## 2024-08-16 PROCEDURE — 97112 NEUROMUSCULAR REEDUCATION: CPT

## 2024-08-16 NOTE — PROGRESS NOTES
"Daily Note     Today's date: 2024  Patient name: Duane A Lacock  : 1960  MRN: [id number]  Referring provider: Colleen Cota PA-C  Dx:   Encounter Diagnosis     ICD-10-CM    1. Cervicalgia  M54.2       2. Primary osteoarthritis of left shoulder  M19.012                      Subjective:   I'm doing better.  No much pain       Objective: See treatment diary below      Assessment: Tolerated treatment well.   Reports minimal symptoms now with activity.  Doing well with ADL performance.  Reports prolonged sitting hard surfaces will still cause symptoms.  Patient would benefit from continued PT      Plan: Continue per plan of care.      Precautions:  N/A       Manuals 24   Manual traction 3'  3'  3'  3' 3'   Stretch c-spine        STM  10' c-spine, L shoulder/scap  10' c-spine, L shoulder/scap 8' c-spine/L shoulder and scap 12'  c-spine, left shoulder/scap 10'  c-spine, left shoulder/scap           Neuro Re-Ed         Chin tuck 3x10 3x10 3x10  3-5\" 3x10  3-5\"  3x10     Chin tuck w/ cervical ext        Wall micah 3x10 3x10 3x10 3x10 3x10   Supine neck flexion 3x10 3x10 3x10 3x10 3x10    Shrug/post roll        LTP/MTP Progressed to machine weights HEP  Blue 3x10 Blue 3x15 LTP/MTP Black 3x10    Lat pulldown  40# 3x10 45# 3x10 45#  3x10     Seated row  40# 3x10 40# 3x10 40# 2x15  (2 )     Cesar ER with scap retraction  Blue 3x10 Blue 3x10 Blue 3x10     T's at webslide  \"T\" black 3x10 \"T\" black 3x10 Dbl blue 3x10  \"T\" Blue 3x15 \"T\" black 3x10   Ther Ex        UBE  alt 10' alt L2  10' alt L2  10' alt  L2 10'  alt   L 1.5 10'  alt  L2   Prone shld flex, ext, horz abd        Self upper trap stretch 5x 15\" cesar  5x 15\" cesar  5x 15-20\"  cesar 5x  15\"  cesar 5x  15\"  cesar   Doorway ER stretch 10x 15\" 10x15\" 10x  15-20\"   5x  15\"  cesar 10x  15\"   Self levator stretch 5x 15\" cesar  5x15\" cesar  5x  15-20\"  cesar 5x  15\"  cesar 5x  15\"  cesar                   HEP        Ther Activity                      "   Gait Training                        Modalities        CP/MHP

## 2024-08-20 ENCOUNTER — APPOINTMENT (OUTPATIENT)
Dept: PHYSICAL THERAPY | Facility: CLINIC | Age: 64
End: 2024-08-20
Payer: COMMERCIAL

## 2024-08-22 ENCOUNTER — APPOINTMENT (OUTPATIENT)
Dept: PHYSICAL THERAPY | Facility: CLINIC | Age: 64
End: 2024-08-22
Payer: COMMERCIAL

## 2024-08-27 ENCOUNTER — APPOINTMENT (OUTPATIENT)
Dept: PHYSICAL THERAPY | Facility: CLINIC | Age: 64
End: 2024-08-27
Payer: COMMERCIAL

## 2024-08-29 ENCOUNTER — APPOINTMENT (OUTPATIENT)
Dept: PHYSICAL THERAPY | Facility: CLINIC | Age: 64
End: 2024-08-29
Payer: COMMERCIAL